# Patient Record
Sex: MALE | Race: BLACK OR AFRICAN AMERICAN | NOT HISPANIC OR LATINO | RURAL
[De-identification: names, ages, dates, MRNs, and addresses within clinical notes are randomized per-mention and may not be internally consistent; named-entity substitution may affect disease eponyms.]

---

## 2023-07-27 ENCOUNTER — OFFICE VISIT (OUTPATIENT)
Dept: PRIMARY CARE CLINIC | Facility: CLINIC | Age: 18
End: 2023-07-27

## 2023-07-27 VITALS
TEMPERATURE: 97 F | OXYGEN SATURATION: 100 % | WEIGHT: 168 LBS | DIASTOLIC BLOOD PRESSURE: 97 MMHG | HEIGHT: 73 IN | HEART RATE: 79 BPM | RESPIRATION RATE: 18 BRPM | SYSTOLIC BLOOD PRESSURE: 141 MMHG | BODY MASS INDEX: 22.26 KG/M2

## 2023-07-27 DIAGNOSIS — M25.641 STIFFNESS OF RIGHT HAND JOINT: ICD-10-CM

## 2023-07-27 DIAGNOSIS — M79.641 HAND PAIN, RIGHT: ICD-10-CM

## 2023-07-27 DIAGNOSIS — Z76.89 RETURN TO WORK EXAM: Primary | ICD-10-CM

## 2023-07-27 PROCEDURE — 99204 OFFICE O/P NEW MOD 45 MIN: CPT | Mod: ,,, | Performed by: REGISTERED NURSE

## 2023-07-27 PROCEDURE — 99204 PR OFFICE/OUTPT VISIT, NEW, LEVL IV, 45-59 MIN: ICD-10-PCS | Mod: ,,, | Performed by: REGISTERED NURSE

## 2023-07-27 NOTE — PATIENT INSTRUCTIONS
We will call you with appointment information for [facility] in Dr. Demarcus Sy when information is available.

## 2023-07-27 NOTE — LETTER
July 27, 2023      [facility]  [address] [facility] [address]  Phone: [phone]  Fax: [phone]       Patient: Virgil Nice   YOB: 2005  Date of Visit: 07/27/2023    To Whom It May Concern:    Charlee Nice  was at [facility] on 07/27/2023. The patient may return to work on 07/28/2023 with no restrictions. If you have any questions or concerns, or if I can be of further assistance, please do not hesitate to contact me.    Sincerely,        CLAUDIA Serrato

## 2023-07-27 NOTE — PROGRESS NOTES
CLAUDIA Serrato        PATIENT NAME: Virgil Nice  : 2005  DATE: 23  MRN: [id number]      Billing Provider: CLAUDIA Serrato  Level of Service: HI OFFICE/OUTPT VISIT, CHOCOLIVIA IV, 45-59 MIN  Patient PCP Information       Provider PCP Type    CLAUDIA Serrato General            Reason for Visit / Chief Complaint: work release       Update PCP  Update Chief Complaint         History of Present Illness / Problem Focused Workflow      HPI Mr. Nice is a 18-year-old AAM here for release to return to work at [employer]. He reports being sent home from work one week ago due to pain and stiffness in right hand. No prior injury or accidents, he believes pain is related to arthritis and repetitive movement caused acute exacerbation. He denies loss of range of motion, states  and strength are good, no tingling or numbness.     Review of Systems     Review of Systems   Constitutional: Negative.    HENT: Negative.     Respiratory: Negative.     Cardiovascular: Negative.    Gastrointestinal: Negative.    Genitourinary: Negative.    Musculoskeletal:  Positive for arthralgias.   Neurological: Negative.       Medical / Social / Family History   History reviewed. No pertinent past medical history.    History reviewed. No pertinent surgical history.    Social History  Mr. Virgil Nice  reports that he has been smoking cigarettes. He has never used smokeless tobacco.    Family History  Mr. Virgil Nice's family history is not on file.    Medications and Allergies     Medications  No outpatient medications have been marked as taking for the 23 encounter (Office Visit) with CLAUDIA Serraot.     Allergies  Review of patient's allergies indicates:  No Known Allergies    Physical Examination     Vitals:    23 0934   BP: (!) 141/97   Pulse:    Resp:    Temp:      Physical Exam  Vitals and nursing note reviewed.   Constitutional:       Appearance: Normal appearance.   HENT:       Head: Normocephalic and atraumatic.      Nose: Nose normal.   Cardiovascular:      Rate and Rhythm: Normal rate and regular rhythm.      Heart sounds: Normal heart sounds.   Pulmonary:      Effort: Pulmonary effort is normal.      Breath sounds: Normal breath sounds.   Musculoskeletal:         General: Normal range of motion.      Cervical back: Normal range of motion.   Skin:     General: Skin is warm and dry.   Neurological:      Mental Status: He is alert and oriented to person, place, and time.      Assessment and Plan (including Health Maintenance)      Problem List  Smart Sets  Document Outside HM   Plan:   Return to work exam    Stiffness of right hand joint    Hand pain, right       While Mr. Nice has had improved movement states the pain has resolved, he requests referral for Orthopedic consult. He reports repetitive movement with his hands often causes the right hand to ache. Increased incidence of cramping in hands started several weeks ago.     Health Maintenance Due   Topic Date Due    Hepatitis C Screening  Never done    Lipid Panel  Never done    COVID-19 Vaccine (1) Never done    Pneumococcal Vaccines (Age 0-64) (1 - PCV) Never done    HPV Vaccines (1 - Male 2-dose series) Never done    HIV Screening  Never done    TETANUS VACCINE  Never done   \  Problem List Items Addressed This Visit    None  Visit Diagnoses       Return to work exam    -  Primary    Stiffness of right hand joint        Hand pain, right              Health Maintenance Topics with due status: Not Due       Topic Last Completion Date    Influenza Vaccine Not Due     No future appointments.     Patient Instructions   We will call you with appointment information for [facility] in Dr. Demarcus Sy when information is available.   Follow up if symptoms worsen or fail to improve.     Signature:  CLAUDIA Serrato      Date of encounter: 7/27/23

## 2023-10-05 ENCOUNTER — OFFICE VISIT (OUTPATIENT)
Dept: PRIMARY CARE CLINIC | Facility: CLINIC | Age: 18
End: 2023-10-05

## 2023-10-05 VITALS
TEMPERATURE: 98 F | RESPIRATION RATE: 18 BRPM | OXYGEN SATURATION: 98 % | SYSTOLIC BLOOD PRESSURE: 127 MMHG | WEIGHT: 158 LBS | HEIGHT: 73 IN | BODY MASS INDEX: 20.94 KG/M2 | HEART RATE: 92 BPM | DIASTOLIC BLOOD PRESSURE: 81 MMHG

## 2023-10-05 DIAGNOSIS — J06.9 VIRAL URI: Primary | ICD-10-CM

## 2023-10-05 LAB
CTP QC/QA: YES
SARS-COV-2 AG RESP QL IA.RAPID: NEGATIVE

## 2023-10-05 PROCEDURE — 99213 PR OFFICE/OUTPT VISIT, EST, LEVL III, 20-29 MIN: ICD-10-PCS | Mod: ,,, | Performed by: STUDENT IN AN ORGANIZED HEALTH CARE EDUCATION/TRAINING PROGRAM

## 2023-10-05 PROCEDURE — 99213 OFFICE O/P EST LOW 20 MIN: CPT | Mod: ,,, | Performed by: STUDENT IN AN ORGANIZED HEALTH CARE EDUCATION/TRAINING PROGRAM

## 2023-10-05 PROCEDURE — 87426 SARSCOV CORONAVIRUS AG IA: CPT | Mod: QW,,, | Performed by: STUDENT IN AN ORGANIZED HEALTH CARE EDUCATION/TRAINING PROGRAM

## 2023-10-05 PROCEDURE — 87426 SARS CORONAVIRUS 2 ANTIGEN POCT: ICD-10-PCS | Mod: QW,,, | Performed by: STUDENT IN AN ORGANIZED HEALTH CARE EDUCATION/TRAINING PROGRAM

## 2023-10-05 NOTE — PROGRESS NOTES
Subjective:      Virgil Nice is a 18 y.o.male who presents to clinic for loss of taste and loss of smell      Symptoms began 2 days and include: loss of taste, loss of smell, and myalgias. Denies fevers, chills, cough, or SOB. No known sick contacts. He has not taken any medications for it.       Past Medical History:  has no past medical history on file.   Past Surgical History:  has no past surgical history on file.  Family History: family history is not on file.  Social History:  reports that he has been smoking cigarettes. He has never used smokeless tobacco.  Allergies: Review of patient's allergies indicates:  No Known Allergies    Objective:     Vitals:    10/05/23 1423   BP: 127/81   Pulse: 92   Resp: 18   Temp: 97.8 °F (36.6 °C)     Physical Exam  Vitals reviewed.   Constitutional:       General: He is not in acute distress.     Appearance: Normal appearance. He is not ill-appearing, toxic-appearing or diaphoretic.   HENT:      Head: Normocephalic and atraumatic.      Right Ear: Tympanic membrane, ear canal and external ear normal.      Left Ear: Tympanic membrane, ear canal and external ear normal.      Nose: Congestion present.      Mouth/Throat:      Mouth: Mucous membranes are moist.      Pharynx: No oropharyngeal exudate or posterior oropharyngeal erythema.   Eyes:      General: No scleral icterus.        Right eye: No discharge.         Left eye: No discharge.   Cardiovascular:      Rate and Rhythm: Normal rate and regular rhythm.      Pulses: Normal pulses.   Pulmonary:      Effort: Pulmonary effort is normal. No respiratory distress.      Breath sounds: Normal breath sounds. No wheezing, rhonchi or rales.   Skin:     General: Skin is warm.   Neurological:      General: No focal deficit present.      Mental Status: He is alert.   Psychiatric:         Behavior: Behavior normal.            Assessment/Plan:     1. Viral URI  - COVID negative  - symptoms likely viral thus abx not indicated at this  time  - can take OTC tylenol prn for symptomatic relief  - encouraged oral rehydration  - SARS Coronavirus 2 Antigen, POCT        Return to clinic if symptoms persist/worsen and as needed.     Noah Akins MD  Family Medicine  10/05/2023

## 2023-10-05 NOTE — LETTER
October 5, 2023      [address] [address]  Phone: [phone]  Fax: [phone]       Patient: Virgil Nice   YOB: 2005  Date of Visit: 10/05/2023    To Whom It May Concern:    Charlee Nice  was at  on 10/05/2023.  If you have any questions or concerns, or if I can be of further assistance, please do not hesitate to contact me.    Sincerely,    Noah Pina MD